# Patient Record
Sex: FEMALE | Race: WHITE | NOT HISPANIC OR LATINO | URBAN - METROPOLITAN AREA
[De-identification: names, ages, dates, MRNs, and addresses within clinical notes are randomized per-mention and may not be internally consistent; named-entity substitution may affect disease eponyms.]

---

## 2017-06-07 ENCOUNTER — APPOINTMENT (OUTPATIENT)
Dept: WOMENS IMAGING | Facility: HOSPITAL | Age: 52
End: 2017-06-07

## 2017-06-07 PROCEDURE — 77067 SCR MAMMO BI INCL CAD: CPT | Performed by: RADIOLOGY

## 2018-07-31 ENCOUNTER — APPOINTMENT (OUTPATIENT)
Dept: WOMENS IMAGING | Facility: HOSPITAL | Age: 53
End: 2018-07-31

## 2018-07-31 PROCEDURE — 77067 SCR MAMMO BI INCL CAD: CPT | Performed by: RADIOLOGY

## 2018-07-31 PROCEDURE — 77063 BREAST TOMOSYNTHESIS BI: CPT | Performed by: RADIOLOGY

## 2019-05-02 VITALS
RESPIRATION RATE: 14 BRPM | HEART RATE: 62 BPM | SYSTOLIC BLOOD PRESSURE: 122 MMHG | DIASTOLIC BLOOD PRESSURE: 77 MMHG | RESPIRATION RATE: 15 BRPM | DIASTOLIC BLOOD PRESSURE: 75 MMHG | DIASTOLIC BLOOD PRESSURE: 65 MMHG | HEART RATE: 76 BPM | RESPIRATION RATE: 20 BRPM | SYSTOLIC BLOOD PRESSURE: 123 MMHG | HEART RATE: 59 BPM | OXYGEN SATURATION: 98 % | HEIGHT: 65 IN | HEART RATE: 75 BPM | SYSTOLIC BLOOD PRESSURE: 140 MMHG | SYSTOLIC BLOOD PRESSURE: 130 MMHG | TEMPERATURE: 97.5 F | DIASTOLIC BLOOD PRESSURE: 80 MMHG | DIASTOLIC BLOOD PRESSURE: 84 MMHG | RESPIRATION RATE: 17 BRPM | HEART RATE: 71 BPM | OXYGEN SATURATION: 96 % | SYSTOLIC BLOOD PRESSURE: 126 MMHG | RESPIRATION RATE: 18 BRPM | SYSTOLIC BLOOD PRESSURE: 127 MMHG | SYSTOLIC BLOOD PRESSURE: 107 MMHG | HEART RATE: 68 BPM | WEIGHT: 137 LBS | DIASTOLIC BLOOD PRESSURE: 71 MMHG | OXYGEN SATURATION: 100 % | HEART RATE: 63 BPM | TEMPERATURE: 97.8 F | HEART RATE: 81 BPM | RESPIRATION RATE: 11 BRPM | OXYGEN SATURATION: 99 % | HEART RATE: 67 BPM | RESPIRATION RATE: 26 BRPM | DIASTOLIC BLOOD PRESSURE: 83 MMHG | DIASTOLIC BLOOD PRESSURE: 74 MMHG

## 2019-05-07 ENCOUNTER — AMBULATORY SURGICAL CENTER (AMBULATORY)
Dept: URBAN - METROPOLITAN AREA SURGERY 17 | Facility: SURGERY | Age: 54
End: 2019-05-07

## 2019-05-07 ENCOUNTER — OFFICE (AMBULATORY)
Dept: URBAN - METROPOLITAN AREA PATHOLOGY 4 | Facility: PATHOLOGY | Age: 54
End: 2019-05-07

## 2019-05-07 DIAGNOSIS — Z12.11 ENCOUNTER FOR SCREENING FOR MALIGNANT NEOPLASM OF COLON: ICD-10-CM

## 2019-05-07 DIAGNOSIS — D12.2 BENIGN NEOPLASM OF ASCENDING COLON: ICD-10-CM

## 2019-05-07 DIAGNOSIS — K64.8 OTHER HEMORRHOIDS: ICD-10-CM

## 2019-05-07 DIAGNOSIS — K57.30 DIVERTICULOSIS OF LARGE INTESTINE WITHOUT PERFORATION OR ABS: ICD-10-CM

## 2019-05-07 LAB
GI HISTOLOGY: A. UNSPECIFIED: (no result)
GI HISTOLOGY: PDF REPORT: (no result)

## 2019-05-07 PROCEDURE — 88305 TISSUE EXAM BY PATHOLOGIST: CPT | Mod: 33 | Performed by: INTERNAL MEDICINE

## 2019-05-07 PROCEDURE — 45380 COLONOSCOPY AND BIOPSY: CPT | Mod: 33 | Performed by: INTERNAL MEDICINE

## 2019-08-05 ENCOUNTER — APPOINTMENT (OUTPATIENT)
Dept: WOMENS IMAGING | Facility: HOSPITAL | Age: 54
End: 2019-08-05

## 2019-08-05 PROCEDURE — 77063 BREAST TOMOSYNTHESIS BI: CPT | Performed by: RADIOLOGY

## 2019-08-05 PROCEDURE — 77067 SCR MAMMO BI INCL CAD: CPT | Performed by: RADIOLOGY

## 2020-08-10 ENCOUNTER — APPOINTMENT (OUTPATIENT)
Dept: WOMENS IMAGING | Facility: HOSPITAL | Age: 55
End: 2020-08-10

## 2020-08-10 PROCEDURE — 77067 SCR MAMMO BI INCL CAD: CPT | Performed by: RADIOLOGY

## 2020-08-10 PROCEDURE — 77063 BREAST TOMOSYNTHESIS BI: CPT | Performed by: RADIOLOGY

## 2021-08-16 ENCOUNTER — APPOINTMENT (OUTPATIENT)
Dept: WOMENS IMAGING | Facility: HOSPITAL | Age: 56
End: 2021-08-16

## 2021-08-16 PROCEDURE — 77067 SCR MAMMO BI INCL CAD: CPT | Performed by: RADIOLOGY

## 2021-08-16 PROCEDURE — 77063 BREAST TOMOSYNTHESIS BI: CPT | Performed by: RADIOLOGY

## 2022-05-16 ENCOUNTER — OFFICE (AMBULATORY)
Dept: URBAN - METROPOLITAN AREA CLINIC 75 | Facility: CLINIC | Age: 57
End: 2022-05-16

## 2022-05-16 VITALS
HEART RATE: 84 BPM | OXYGEN SATURATION: 97 % | DIASTOLIC BLOOD PRESSURE: 82 MMHG | SYSTOLIC BLOOD PRESSURE: 124 MMHG | WEIGHT: 137 LBS | HEIGHT: 65 IN

## 2022-05-16 DIAGNOSIS — K57.92 DIVERTICULITIS OF INTESTINE, PART UNSPECIFIED, WITHOUT PERFO: ICD-10-CM

## 2022-05-16 DIAGNOSIS — K58.2 MIXED IRRITABLE BOWEL SYNDROME: ICD-10-CM

## 2022-05-16 DIAGNOSIS — Z86.010 PERSONAL HISTORY OF COLONIC POLYPS: ICD-10-CM

## 2022-05-16 DIAGNOSIS — K58.9 IRRITABLE BOWEL SYNDROME WITHOUT DIARRHEA: ICD-10-CM

## 2022-05-16 PROCEDURE — 99203 OFFICE O/P NEW LOW 30 MIN: CPT | Performed by: INTERNAL MEDICINE

## 2022-08-17 ENCOUNTER — APPOINTMENT (OUTPATIENT)
Dept: WOMENS IMAGING | Facility: HOSPITAL | Age: 57
End: 2022-08-17

## 2022-08-17 VITALS
HEART RATE: 74 BPM | OXYGEN SATURATION: 99 % | RESPIRATION RATE: 14 BRPM | RESPIRATION RATE: 15 BRPM | DIASTOLIC BLOOD PRESSURE: 80 MMHG | SYSTOLIC BLOOD PRESSURE: 129 MMHG | DIASTOLIC BLOOD PRESSURE: 70 MMHG | DIASTOLIC BLOOD PRESSURE: 74 MMHG | DIASTOLIC BLOOD PRESSURE: 73 MMHG | HEIGHT: 65 IN | SYSTOLIC BLOOD PRESSURE: 121 MMHG | SYSTOLIC BLOOD PRESSURE: 87 MMHG | DIASTOLIC BLOOD PRESSURE: 76 MMHG | RESPIRATION RATE: 17 BRPM | SYSTOLIC BLOOD PRESSURE: 123 MMHG | SYSTOLIC BLOOD PRESSURE: 125 MMHG | SYSTOLIC BLOOD PRESSURE: 112 MMHG | DIASTOLIC BLOOD PRESSURE: 72 MMHG | OXYGEN SATURATION: 98 % | HEART RATE: 67 BPM | DIASTOLIC BLOOD PRESSURE: 83 MMHG | RESPIRATION RATE: 23 BRPM | RESPIRATION RATE: 20 BRPM | DIASTOLIC BLOOD PRESSURE: 89 MMHG | SYSTOLIC BLOOD PRESSURE: 132 MMHG | OXYGEN SATURATION: 95 % | HEART RATE: 81 BPM | DIASTOLIC BLOOD PRESSURE: 78 MMHG | SYSTOLIC BLOOD PRESSURE: 108 MMHG | SYSTOLIC BLOOD PRESSURE: 114 MMHG | HEART RATE: 82 BPM | RESPIRATION RATE: 16 BRPM | DIASTOLIC BLOOD PRESSURE: 53 MMHG | HEART RATE: 62 BPM | SYSTOLIC BLOOD PRESSURE: 131 MMHG | HEART RATE: 71 BPM | HEART RATE: 75 BPM | TEMPERATURE: 97.2 F | HEART RATE: 64 BPM | SYSTOLIC BLOOD PRESSURE: 128 MMHG | RESPIRATION RATE: 22 BRPM | TEMPERATURE: 97.4 F | WEIGHT: 137 LBS | SYSTOLIC BLOOD PRESSURE: 127 MMHG | HEART RATE: 72 BPM

## 2022-08-17 PROCEDURE — 77067 SCR MAMMO BI INCL CAD: CPT | Performed by: RADIOLOGY

## 2022-08-17 PROCEDURE — 77063 BREAST TOMOSYNTHESIS BI: CPT | Performed by: RADIOLOGY

## 2022-08-23 ENCOUNTER — AMBULATORY SURGICAL CENTER (AMBULATORY)
Dept: URBAN - METROPOLITAN AREA SURGERY 17 | Facility: SURGERY | Age: 57
End: 2022-08-23

## 2022-08-23 ENCOUNTER — OFFICE (AMBULATORY)
Dept: URBAN - METROPOLITAN AREA PATHOLOGY 4 | Facility: PATHOLOGY | Age: 57
End: 2022-08-23

## 2022-08-23 DIAGNOSIS — K64.8 OTHER HEMORRHOIDS: ICD-10-CM

## 2022-08-23 DIAGNOSIS — K57.30 DIVERTICULOSIS OF LARGE INTESTINE WITHOUT PERFORATION OR ABS: ICD-10-CM

## 2022-08-23 DIAGNOSIS — K58.9 IRRITABLE BOWEL SYNDROME WITHOUT DIARRHEA: ICD-10-CM

## 2022-08-23 DIAGNOSIS — K62.1 RECTAL POLYP: ICD-10-CM

## 2022-08-23 DIAGNOSIS — Z86.010 PERSONAL HISTORY OF COLONIC POLYPS: ICD-10-CM

## 2022-08-23 LAB
GI HISTOLOGY: A. UNSPECIFIED: (no result)
GI HISTOLOGY: PDF REPORT: (no result)

## 2022-08-23 PROCEDURE — 88305 TISSUE EXAM BY PATHOLOGIST: CPT | Performed by: INTERNAL MEDICINE

## 2022-08-23 PROCEDURE — 45385 COLONOSCOPY W/LESION REMOVAL: CPT | Performed by: INTERNAL MEDICINE

## 2022-10-11 ENCOUNTER — APPOINTMENT (OUTPATIENT)
Dept: GENERAL RADIOLOGY | Facility: HOSPITAL | Age: 57
End: 2022-10-11

## 2022-10-11 ENCOUNTER — HOSPITAL ENCOUNTER (EMERGENCY)
Facility: HOSPITAL | Age: 57
Discharge: HOME | End: 2022-10-11
Attending: EMERGENCY MEDICINE | Admitting: EMERGENCY MEDICINE

## 2022-10-11 VITALS
WEIGHT: 143.74 LBS | SYSTOLIC BLOOD PRESSURE: 105 MMHG | HEART RATE: 86 BPM | RESPIRATION RATE: 20 BRPM | TEMPERATURE: 98 F | DIASTOLIC BLOOD PRESSURE: 76 MMHG | OXYGEN SATURATION: 95 %

## 2022-10-11 DIAGNOSIS — R79.89 ELEVATED SERUM CREATININE: ICD-10-CM

## 2022-10-11 DIAGNOSIS — I47.1 SVT (SUPRAVENTRICULAR TACHYCARDIA): Primary | ICD-10-CM

## 2022-10-11 DIAGNOSIS — R73.9 HYPERGLYCEMIA: ICD-10-CM

## 2022-10-11 LAB
ALBUMIN SERPL-MCNC: 4.7 G/DL (ref 3.5–5.2)
ALBUMIN/GLOB SERPL: 2.4 G/DL
ALP SERPL-CCNC: 94 U/L (ref 39–117)
ALT SERPL W P-5'-P-CCNC: 58 U/L (ref 1–33)
ANION GAP SERPL CALCULATED.3IONS-SCNC: 19.2 MMOL/L (ref 5–15)
AST SERPL-CCNC: 57 U/L (ref 1–32)
BASOPHILS # BLD MANUAL: 0.15 10*3/MM3 (ref 0–0.2)
BASOPHILS NFR BLD MANUAL: 1 % (ref 0–1.5)
BILIRUB SERPL-MCNC: 0.3 MG/DL (ref 0–1.2)
BUN SERPL-MCNC: 10 MG/DL (ref 6–20)
BUN/CREAT SERPL: 8.8 (ref 7–25)
CALCIUM SPEC-SCNC: 9.7 MG/DL (ref 8.6–10.5)
CHLORIDE SERPL-SCNC: 103 MMOL/L (ref 98–107)
CO2 SERPL-SCNC: 18.8 MMOL/L (ref 22–29)
CREAT SERPL-MCNC: 1.13 MG/DL (ref 0.57–1)
DEPRECATED RDW RBC AUTO: 39.3 FL (ref 37–54)
EGFRCR SERPLBLD CKD-EPI 2021: 56.2 ML/MIN/1.73
EOSINOPHIL # BLD MANUAL: 0.15 10*3/MM3 (ref 0–0.4)
EOSINOPHIL NFR BLD MANUAL: 1 % (ref 0.3–6.2)
ERYTHROCYTE [DISTWIDTH] IN BLOOD BY AUTOMATED COUNT: 11.4 % (ref 12.3–15.4)
GLOBULIN UR ELPH-MCNC: 2 GM/DL
GLUCOSE SERPL-MCNC: 193 MG/DL (ref 65–99)
HCT VFR BLD AUTO: 41.3 % (ref 34–46.6)
HGB BLD-MCNC: 14.3 G/DL (ref 12–15.9)
LYMPHOCYTES # BLD MANUAL: 5.94 10*3/MM3 (ref 0.7–3.1)
LYMPHOCYTES NFR BLD MANUAL: 3 % (ref 5–12)
MAGNESIUM SERPL-MCNC: 2.4 MG/DL (ref 1.6–2.6)
MCH RBC QN AUTO: 32.4 PG (ref 26.6–33)
MCHC RBC AUTO-ENTMCNC: 34.6 G/DL (ref 31.5–35.7)
MCV RBC AUTO: 93.7 FL (ref 79–97)
MONOCYTES # BLD: 0.46 10*3/MM3 (ref 0.1–0.9)
NEUTROPHILS # BLD AUTO: 8.76 10*3/MM3 (ref 1.7–7)
NEUTROPHILS NFR BLD MANUAL: 56.6 % (ref 42.7–76)
PLAT MORPH BLD: NORMAL
PLATELET # BLD AUTO: 293 10*3/MM3 (ref 140–450)
PMV BLD AUTO: 10.2 FL (ref 6–12)
POTASSIUM SERPL-SCNC: 3.6 MMOL/L (ref 3.5–5.2)
PROT SERPL-MCNC: 6.7 G/DL (ref 6–8.5)
RBC # BLD AUTO: 4.41 10*6/MM3 (ref 3.77–5.28)
RBC MORPH BLD: NORMAL
SODIUM SERPL-SCNC: 141 MMOL/L (ref 136–145)
T4 FREE SERPL-MCNC: 1.35 NG/DL (ref 0.93–1.7)
TROPONIN T SERPL-MCNC: <0.01 NG/ML (ref 0–0.03)
TSH SERPL DL<=0.05 MIU/L-ACNC: 5.25 UIU/ML (ref 0.27–4.2)
VARIANT LYMPHS NFR BLD MANUAL: 38.4 % (ref 19.6–45.3)
WBC MORPH BLD: NORMAL
WBC NRBC COR # BLD: 15.47 10*3/MM3 (ref 3.4–10.8)

## 2022-10-11 PROCEDURE — 99283 EMERGENCY DEPT VISIT LOW MDM: CPT

## 2022-10-11 PROCEDURE — 71045 X-RAY EXAM CHEST 1 VIEW: CPT

## 2022-10-11 PROCEDURE — 83735 ASSAY OF MAGNESIUM: CPT | Performed by: EMERGENCY MEDICINE

## 2022-10-11 PROCEDURE — 93010 ELECTROCARDIOGRAM REPORT: CPT | Performed by: INTERNAL MEDICINE

## 2022-10-11 PROCEDURE — 25010000002 ADENOSINE PER 6 MG

## 2022-10-11 PROCEDURE — 93005 ELECTROCARDIOGRAM TRACING: CPT | Performed by: EMERGENCY MEDICINE

## 2022-10-11 PROCEDURE — 84439 ASSAY OF FREE THYROXINE: CPT | Performed by: EMERGENCY MEDICINE

## 2022-10-11 PROCEDURE — 84484 ASSAY OF TROPONIN QUANT: CPT | Performed by: EMERGENCY MEDICINE

## 2022-10-11 PROCEDURE — 85007 BL SMEAR W/DIFF WBC COUNT: CPT | Performed by: EMERGENCY MEDICINE

## 2022-10-11 PROCEDURE — 80050 GENERAL HEALTH PANEL: CPT | Performed by: EMERGENCY MEDICINE

## 2022-10-11 PROCEDURE — 96374 THER/PROPH/DIAG INJ IV PUSH: CPT

## 2022-10-11 PROCEDURE — 96361 HYDRATE IV INFUSION ADD-ON: CPT

## 2022-10-11 PROCEDURE — 36415 COLL VENOUS BLD VENIPUNCTURE: CPT

## 2022-10-11 RX ORDER — ADENOSINE 3 MG/ML
6 INJECTION, SOLUTION INTRAVENOUS ONCE
Status: COMPLETED | OUTPATIENT
Start: 2022-10-11 | End: 2022-10-11

## 2022-10-11 RX ORDER — ADENOSINE 3 MG/ML
INJECTION, SOLUTION INTRAVENOUS
Status: COMPLETED
Start: 2022-10-11 | End: 2022-10-11

## 2022-10-11 RX ORDER — ADENOSINE 3 MG/ML
12 INJECTION, SOLUTION INTRAVENOUS
Status: DISCONTINUED | OUTPATIENT
Start: 2022-10-11 | End: 2022-10-11 | Stop reason: HOSPADM

## 2022-10-11 RX ADMIN — ADENOSINE 6 MG: 3 INJECTION INTRAVENOUS at 19:48

## 2022-10-11 RX ADMIN — ADENOSINE 6 MG: 3 INJECTION, SOLUTION INTRAVENOUS at 19:48

## 2022-10-11 RX ADMIN — SODIUM CHLORIDE 500 ML: 9 INJECTION, SOLUTION INTRAVENOUS at 19:45

## 2022-10-11 NOTE — ED TRIAGE NOTES
Pt to ER from home with c/o palpitations that started around 30 minutes ago. Pt has been in afib before, but does not take anything for it. Pt states she is nausea and lightheaded. Pt to triage with mask on along with nursing staff.

## 2022-10-11 NOTE — ED PROVIDER NOTES
EMERGENCY DEPARTMENT ENCOUNTER  I wore full protective equipment throughout this patient encounter including a N95 mask, eye shield, gown and gloves. Hand hygiene was performed before donning protective equipment and after removal when leaving the room.    Room Number:  05/05  Date of encounter:  10/11/2022  PCP: Provider, No Known    HPI:  Context: Anastacia Burgos is a 57 y.o. female who presents to the ED c/o chief complaint of heart racing.  Patient reports she had sudden onset heart racing sensation approximately an hour ago.  Patient reports that she feels her heart beating strongly and quickly, reports that she did have some lightheadedness, denies any syncope, no fall or trauma.  Patient does endorse associated shortness of breath, no chest pain.  Patient reports that she was at baseline health prior to onset of symptoms.  Patient reports history of SVT in the past, 1 prior episode, was seen in the emergency department, converted without medicines, not any medications for it.  Patient denies any recent vomiting or diarrhea, eating and drinking normally, no fever or systemic symptoms.    MEDICAL HISTORY REVIEW  Reviewed in EPIC    PAST MEDICAL HISTORY  Active Ambulatory Problems     Diagnosis Date Noted   • No Active Ambulatory Problems     Resolved Ambulatory Problems     Diagnosis Date Noted   • No Resolved Ambulatory Problems     No Additional Past Medical History       PAST SURGICAL HISTORY  No past surgical history on file.    FAMILY HISTORY  No family history on file.    SOCIAL HISTORY  Social History     Socioeconomic History   • Marital status: Unknown       ALLERGIES  Patient has no known allergies.    The patient's allergies have been reviewed    REVIEW OF SYSTEMS  All systems reviewed and negative except for those discussed in HPI.     PHYSICAL EXAM  I have reviewed the triage vital signs and nursing notes.  ED Triage Vitals [10/11/22 1931]   Temp Pulse Resp BP SpO2   98 °F (36.7 °C) -- 20 -- --       Temp src Heart Rate Source Patient Position BP Location FiO2 (%)   -- -- -- -- --       General: No acute distress.  HENT: NCAT, PERRL, Nares patent.  Eyes: no scleral icterus.  Neck: trachea midline, no ROM limitations.  CV: regular rhythm, extremely tachycardic rate rate.  Respiratory: normal effort, CTAB.  Abdomen: soft, nondistended, NTTP, no rebound tenderness, no guarding or rigidity.  Musculoskeletal: no deformity.  Neuro: alert, moves all extremities, follows commands.  Skin: warm, dry.    LAB RESULTS  Recent Results (from the past 24 hour(s))   ECG 12 Lead    Collection Time: 10/11/22  7:38 PM   Result Value Ref Range    QT Interval 287 ms   Comprehensive Metabolic Panel    Collection Time: 10/11/22  8:09 PM    Specimen: Blood   Result Value Ref Range    Glucose 193 (H) 65 - 99 mg/dL    BUN 10 6 - 20 mg/dL    Creatinine 1.13 (H) 0.57 - 1.00 mg/dL    Sodium 141 136 - 145 mmol/L    Potassium 3.6 3.5 - 5.2 mmol/L    Chloride 103 98 - 107 mmol/L    CO2 18.8 (L) 22.0 - 29.0 mmol/L    Calcium 9.7 8.6 - 10.5 mg/dL    Total Protein 6.7 6.0 - 8.5 g/dL    Albumin 4.70 3.50 - 5.20 g/dL    ALT (SGPT) 58 (H) 1 - 33 U/L    AST (SGOT) 57 (H) 1 - 32 U/L    Alkaline Phosphatase 94 39 - 117 U/L    Total Bilirubin 0.3 0.0 - 1.2 mg/dL    Globulin 2.0 gm/dL    A/G Ratio 2.4 g/dL    BUN/Creatinine Ratio 8.8 7.0 - 25.0    Anion Gap 19.2 (H) 5.0 - 15.0 mmol/L    eGFR 56.2 (L) >60.0 mL/min/1.73   Troponin    Collection Time: 10/11/22  8:09 PM    Specimen: Blood   Result Value Ref Range    Troponin T <0.010 0.000 - 0.030 ng/mL   T4, Free    Collection Time: 10/11/22  8:09 PM    Specimen: Blood   Result Value Ref Range    Free T4 1.35 0.93 - 1.70 ng/dL   TSH    Collection Time: 10/11/22  8:09 PM    Specimen: Blood   Result Value Ref Range    TSH 5.250 (H) 0.270 - 4.200 uIU/mL   Magnesium    Collection Time: 10/11/22  8:09 PM    Specimen: Blood   Result Value Ref Range    Magnesium 2.4 1.6 - 2.6 mg/dL   CBC Auto Differential     Collection Time: 10/11/22  8:09 PM    Specimen: Blood   Result Value Ref Range    WBC 15.47 (H) 3.40 - 10.80 10*3/mm3    RBC 4.41 3.77 - 5.28 10*6/mm3    Hemoglobin 14.3 12.0 - 15.9 g/dL    Hematocrit 41.3 34.0 - 46.6 %    MCV 93.7 79.0 - 97.0 fL    MCH 32.4 26.6 - 33.0 pg    MCHC 34.6 31.5 - 35.7 g/dL    RDW 11.4 (L) 12.3 - 15.4 %    RDW-SD 39.3 37.0 - 54.0 fl    MPV 10.2 6.0 - 12.0 fL    Platelets 293 140 - 450 10*3/mm3   Manual Differential    Collection Time: 10/11/22  8:09 PM    Specimen: Blood   Result Value Ref Range    Neutrophil % 56.6 42.7 - 76.0 %    Lymphocyte % 38.4 19.6 - 45.3 %    Monocyte % 3.0 (L) 5.0 - 12.0 %    Eosinophil % 1.0 0.3 - 6.2 %    Basophil % 1.0 0.0 - 1.5 %    Neutrophils Absolute 8.76 (H) 1.70 - 7.00 10*3/mm3    Lymphocytes Absolute 5.94 (H) 0.70 - 3.10 10*3/mm3    Monocytes Absolute 0.46 0.10 - 0.90 10*3/mm3    Eosinophils Absolute 0.15 0.00 - 0.40 10*3/mm3    Basophils Absolute 0.15 0.00 - 0.20 10*3/mm3    RBC Morphology Normal Normal    WBC Morphology Normal Normal    Platelet Morphology Normal Normal       I ordered the above labs and reviewed the results.    RADIOLOGY  XR Chest 1 View    Result Date: 10/11/2022  XR CHEST 1 VW-  Clinical: Palpitation  COMPARISON 4/22/2014  FINDINGS: The cardiomediastinal silhouette is stable and satisfactory in appearance. The lungs are clear. No effusion or edema seen.  CONCLUSION: No active disease of the chest  This report was finalized on 10/11/2022 8:23 PM by Dr. Chao Jean M.D.        I ordered the above noted radiological studies. I reviewed the images and results. I agree with the radiologist interpretation.    PROCEDURES  Procedures    MEDICATIONS GIVEN IN ER  Medications   adenosine (ADENOCARD) injection 12 mg (has no administration in time range)   adenosine (ADENOCARD) injection 6 mg (6 mg Intravenous Given 10/11/22 1948)   sodium chloride 0.9 % bolus 500 mL (0 mL Intravenous Stopped 10/11/22 1954)       PROGRESS, DATA ANALYSIS,  CONSULTS, AND MEDICAL DECISION MAKING  A complete history and physical exam have been performed.  All available laboratory and imaging results have been reviewed by myself prior to disposition.    MDM  After the initial H&P, I discussed pertinent information from history and physical exam with patient/family.  Discussed differential diagnosis.  Discussed plan for ED evaluation/workup/treatment.  All questions answered.  Patient/family is agreeable with plan.  ED Course as of 10/11/22 2120   Tue Oct 11, 2022   1947 Patient presented in SVT, heart rate approximately 200, borderline low blood pressure, systolic of 100.  Initially attempted chemical cardioversion with 6 mg of adenosine, unsuccessful.  Blood pressure decreased to 90/60.  Preparing for second attempted chemical cardioversion, giving IV fluids, while preparing, patient had spontaneous conversion to normal sinus rhythm.  Obtaining repeat EKG. [JG]   1949 EKG independently viewed and contemporaneously interpreted by ED physician. Time: 1938.  Rate 200.  Interpretation: SVT, normal axis, RVH, diffuse ST changes likely rate related. [JG]   1950 EKG independently viewed and contemporaneously interpreted by ED physician. Time: 1948.  Rate 72.  Interpretation: Normal sinus rhythm with occasional conducted PAC, normal axis, right bundle branch block, previously seen ST changes are more minimal and appear to be resolving. [JG]   2023 Patient reassessed.  Patient is currently asymptomatic, reports complete symptom resolution.  Patient is not tachycardic at present, normotensive.  Discussed pending ED work-up.  Patient has no questions or concerns at present. [JG]   2118 Patient has leukocytosis, likely reactive as patient denied any fever or infectious symptoms.  Patient has elevated creatinine at the upper limit of normal with mild anion gap, likely secondary to severe tachycardia, should resolve without treatment.  Patient did receive IV fluids here.  Glucose  elevated, patient will need outpatient follow-up for further evaluation as this could be for sign of diabetes.  Patient remained asymptomatic since heart rhythm converted.  Patient is well-appearing, requesting discharge.  Discussed need for close follow-up with primary care, discussed referral to cardiology, extensive discussion return precautions.  Discharging. [JG]      ED Course User Index  [JG] Fabien Chinchilla MD       AS OF 21:20 EDT VITALS:    BP - 105/76  HR - 86  TEMP - 98 °F (36.7 °C)  O2 SATS - 95%    DIAGNOSIS  Final diagnoses:   SVT (supraventricular tachycardia) (HCC)   Hyperglycemia   Elevated serum creatinine         DISPOSITION  DISCHARGE    Patient discharged in stable condition.    Reviewed implications of results, diagnosis, meds, responsibility to follow up, warning signs and symptoms of possible worsening, potential complications and reasons to return to ER.    Patient/Family voiced understanding of above instructions.    Discussed plan for discharge, as there is no emergent indication for admission. Patient referred to primary care provider for BP management due to today's BP. Pt/family is agreeable and understands need for follow up and repeat testing.  Pt is aware that discharge does not mean that nothing is wrong but it indicates no emergency is present that requires admission and they must continue care with follow-up as given below or physician of their choice.     FOLLOW-UP  Your PCP    Schedule an appointment as soon as possible for a visit in 2 days  even if well    PATIENT CONNECTION - [facility] [id number] [phone]    if you are unable to follow up with your PCP    [facility] CARDIOLOGY  [address]  [address]  [facility] [address] [phone]  Schedule an appointment as soon as possible for a visit in 2 days  for further evaluation/management of your SVT         Medication List      No changes were made to your prescriptions  during this visit.          Fabien Chinchilla MD  10/11/22 0121

## 2022-10-11 NOTE — NURSING NOTE
Pt presents to Er 5. Cardiac monitor appeared to show svt in the 204 range.  B/p noted as 62/51. 500 ns and 6mg IV adenosine given x1 per dr leslie, preparation made for 2nd dose and possible defib. As pt was being repositioned for defib paddles hr noted to convert to nsr 82.

## 2022-10-12 LAB
QT INTERVAL: 287 MS
QT INTERVAL: 398 MS

## 2022-10-17 ENCOUNTER — OFFICE VISIT (OUTPATIENT)
Dept: CARDIOLOGY | Facility: CLINIC | Age: 57
End: 2022-10-17

## 2022-10-17 VITALS
HEIGHT: 65 IN | WEIGHT: 140 LBS | OXYGEN SATURATION: 99 % | RESPIRATION RATE: 18 BRPM | DIASTOLIC BLOOD PRESSURE: 84 MMHG | BODY MASS INDEX: 23.32 KG/M2 | SYSTOLIC BLOOD PRESSURE: 118 MMHG | HEART RATE: 59 BPM

## 2022-10-17 DIAGNOSIS — I47.1 PAROXYSMAL SVT (SUPRAVENTRICULAR TACHYCARDIA): Primary | ICD-10-CM

## 2022-10-17 DIAGNOSIS — E78.2 MIXED HYPERLIPIDEMIA: ICD-10-CM

## 2022-10-17 PROCEDURE — 93000 ELECTROCARDIOGRAM COMPLETE: CPT | Performed by: INTERNAL MEDICINE

## 2022-10-17 PROCEDURE — 99204 OFFICE O/P NEW MOD 45 MIN: CPT | Performed by: INTERNAL MEDICINE

## 2022-10-17 RX ORDER — AMOXICILLIN 875 MG/1
TABLET, COATED ORAL
COMMUNITY
Start: 2022-10-12 | End: 2022-10-27

## 2022-10-17 RX ORDER — ROSUVASTATIN CALCIUM 10 MG/1
TABLET, COATED ORAL
COMMUNITY
Start: 2022-10-09

## 2022-10-17 NOTE — PROGRESS NOTES
"      CARDIOLOGY    Quiana Valera MD    ENCOUNTER DATE:  10/17/2022    Anastacia Burgos / 57 y.o. / female        CHIEF COMPLAINT / REASON FOR OFFICE VISIT     Heart Problem (ER follow up SVT, Elevated creatine serum/)      HISTORY OF PRESENT ILLNESS       HPI    Anastacia Burgos is a 57 y.o. female     This lady reports a history of CC about 8 years ago.  On 10/11/2022, she was having dinner and suddenly felt her heart racing.  She tried Valsalva, cold water to the face, but her heart continued to race.  She went to the emergency room.  EKG there showed SVT.  She was given adenosine and converted to a sinus rhythm.  She had a stress echo at [facility] in 2021.  She, otherwise, feels fine.  She is not having shortness of breath, edema, lightheadedness, chest pain, or fatigue.      REVIEW OF SYSTEMS     Review of Systems   Constitutional: Negative for chills, fever, weight gain and weight loss.   Cardiovascular: Negative for leg swelling.   Respiratory: Negative for cough, snoring and wheezing.    Hematologic/Lymphatic: Negative for bleeding problem. Does not bruise/bleed easily.   Skin: Negative for color change.   Musculoskeletal: Negative for falls, joint pain and myalgias.   Gastrointestinal: Negative for melena.   Genitourinary: Negative for hematuria.   Neurological: Negative for excessive daytime sleepiness.   Psychiatric/Behavioral: Negative for depression. The patient is not nervous/anxious.          VITAL SIGNS     Visit Vitals  /84 (BP Location: Right arm, Patient Position: Sitting, Cuff Size: Adult)   Pulse 59   Resp 18   Ht 163.8 cm (64.5\")   Wt 63.5 kg (140 lb)   SpO2 99%   BMI 23.66 kg/m²         Wt Readings from Last 3 Encounters:   10/17/22 63.5 kg (140 lb)   10/11/22 65.2 kg (143 lb 11.8 oz)     Body mass index is 23.66 kg/m².      PHYSICAL EXAMINATION     Constitutional:       General: Not in acute distress.  Neck:      Vascular: No carotid bruit or JVD.   Pulmonary:      Effort: Pulmonary effort is " normal.      Breath sounds: Normal breath sounds.   Cardiovascular:      Normal rate. Regular rhythm.      Murmurs: There is no murmur.   Psychiatric:         Mood and Affect: Mood and affect normal.           REVIEWED DATA       ECG 12 Lead    Date/Time: 10/17/2022 10:35 AM  Performed by: Quiana Valera MD  Authorized by: Quiana Valera MD   Comparison: compared with previous ECG from 10/11/2022  Comparison to previous ECG: SVT has resolved  Rhythm: sinus rhythm  BPM: 59  Conduction: conduction normal  Conduction: incomplete right bundle branch block  ST Segments: ST segments normal  T Waves: T waves normal  Other findings: non-specific ST-T wave changes    Clinical impression: abnormal EKG                  Lab Results   Component Value Date    GLUCOSE 193 (H) 10/11/2022    BUN 10 10/11/2022    CREATININE 1.13 (H) 10/11/2022    BCR 8.8 10/11/2022    K 3.6 10/11/2022    CO2 18.8 (L) 10/11/2022    CALCIUM 9.7 10/11/2022    ALBUMIN 4.70 10/11/2022    AST 57 (H) 10/11/2022    ALT 58 (H) 10/11/2022       ASSESSMENT & PLAN      Diagnosis Plan   1. Paroxysmal SVT (supraventricular tachycardia) (HCC)        2. Mixed hyperlipidemia            1. SVT. She has had two bouts of this in 8 years. No exacerbating or relieving factors. She had a stress echo at [facility] in 2021 and I am going to try to get results of that. We discussed her options including waiting, ablation or seeing an EP to discuss further. She is leaning towards seeing an EP to discuss further. I will review the results of her stress echo from [facility] in 2021 when they are available from [facility] and forward her EKG's onto Dr. Toribio and Dr. Conway for further review.   2. Hyperlipidemia on rosuvastatin. No cholesterol numbers to review at this time, but I did review her other laboratory data from her trip to the ER.   3. Mildly elevated creatinine. Follow up with PCP.     Addendum: I received a copy of the stress echo from the [facility] system September 28,  2021.  She exercised for 9 minutes and 19 seconds.  Resting ejection fraction of 50 to 55%.  No evidence of ischemia.  Normal heart valves.    Orders Placed This Encounter   Procedures   • ECG 12 Lead     This order was created via procedure documentation     Order Specific Question:   Release to patient     Answer:   Routine Release           MEDICATIONS         Discharge Medications          Accurate as of October 17, 2022 10:39 AM. If you have any questions, ask your nurse or doctor.            Continue These Medications      Instructions Start Date   amoxicillin 875 MG tablet  Commonly known as: AMOXIL   No dose, route, or frequency recorded.      rosuvastatin 10 MG tablet  Commonly known as: CRESTOR   No dose, route, or frequency recorded.               Quiana Valera MD  10/17/22  10:39 EDT    Part of this note may be an electronic transcription/translation of spoken language to printed text using the Dragon dictation system.

## 2022-10-27 ENCOUNTER — OFFICE VISIT (OUTPATIENT)
Dept: CARDIOLOGY | Facility: CLINIC | Age: 57
End: 2022-10-27

## 2022-10-27 VITALS
SYSTOLIC BLOOD PRESSURE: 138 MMHG | BODY MASS INDEX: 23.49 KG/M2 | WEIGHT: 141 LBS | DIASTOLIC BLOOD PRESSURE: 86 MMHG | HEART RATE: 73 BPM | HEIGHT: 65 IN

## 2022-10-27 DIAGNOSIS — I47.1 PAROXYSMAL SVT (SUPRAVENTRICULAR TACHYCARDIA): Primary | ICD-10-CM

## 2022-10-27 PROCEDURE — 93000 ELECTROCARDIOGRAM COMPLETE: CPT

## 2022-10-27 PROCEDURE — 99213 OFFICE O/P EST LOW 20 MIN: CPT

## 2022-10-27 NOTE — PROGRESS NOTES
Date of Office Visit: 10/27/2022  Encounter Provider: JEROME Cesar  Place of Service: [facility] CARDIOLOGY  Patient Name: Anastacia Burgos  :1965    Chief Complaint   Patient presents with   • Rapid Heart Rate     New Patient Consult / Dr. Valera ref   :     HPI: Anastacia Burgos is a 57 y.o. female who follows with Dr. Valera, referred to Dr. Mcallister for SVT.      She presented to the ED on 10/11/2022 with complaints of palpitations. She was in SVT with a rate of 200 bpm, she received adenosine and converted to NSR.     Reviewing her chart. She had a similar episode in , presented to ED in SVT and broke with valsalva.     She had normal stress echo last year at [facility].                She presents today to establish care.     She says that on 10/11 she was eating dinner and suddenly felt her heart racing and became very dizzy. She tried valsalva and cold water but was not effective. After ~30 minutes she came to ED and was in SVT, broke with one dose of adenosine.     Patient says that this was the second episode of SVT that she has had. The first occurred in , she presented to the ED in SVT but broke with valsalva.     She denies any history of palpitations, dizziness, or syncope prior to .     Patient does not recall any similar episodes as a child.     Denies having shortness of breath, edema, lightheadedness, chest pain, or fatigue.    EKG today shows NSR.     She is not on any AV charly blockers.     History reviewed. No pertinent past medical history.    History reviewed. No pertinent surgical history.    Social History     Socioeconomic History   • Marital status: Unknown   Tobacco Use   • Smoking status: Some Days     Types: Cigarettes, Cigars   • Smokeless tobacco: Never   • Tobacco comments:     Occasional Cigar usage           Caffeine: Yes, Coffee in AM -- ice tea throughout the day    Vaping Use   • Vaping Use: Never used   Substance and Sexual Activity   • Alcohol  "use: Yes     Alcohol/week: 3.0 standard drinks     Types: 3 Glasses of wine per week   • Drug use: Never   • Sexual activity: Defer       Family History   Problem Relation Age of Onset   • Stroke Neg Hx    • Heart attack Neg Hx    • Heart disease Neg Hx    • Aneurysm Neg Hx    • Bone cancer Neg Hx    • Cancer Neg Hx        Review of Systems   Constitutional: Negative for chills, fever and malaise/fatigue.   Cardiovascular: Negative for chest pain, dyspnea on exertion, leg swelling, near-syncope, orthopnea, palpitations, paroxysmal nocturnal dyspnea and syncope.   Respiratory: Negative for cough and shortness of breath.    Musculoskeletal: Negative for joint pain, joint swelling and myalgias.   Gastrointestinal: Negative for abdominal pain, diarrhea, melena, nausea and vomiting.   Genitourinary: Negative for frequency and hematuria.   Neurological: Negative for light-headedness, numbness, paresthesias and seizures.   Allergic/Immunologic: Negative.    All other systems reviewed and are negative.      No Known Allergies      Current Outpatient Medications:   •  rosuvastatin (CRESTOR) 10 MG tablet, , Disp: , Rfl:   •  amoxicillin (AMOXIL) 875 MG tablet, , Disp: , Rfl:       Objective:     Vitals:    10/27/22 1132   BP: 138/86   Pulse: 73   Weight: 64 kg (141 lb)   Height: 165.1 cm (65\")     Body mass index is 23.46 kg/m².    PHYSICAL EXAM:    Vitals Reviewed.   General Appearance: No acute distress, well developed and well nourished.   Eyes: Conjunctiva and lids: No erythema, swelling, or discharge. Sclera non-icteric.   HENT: Atraumatic, normocephalic. External eyes, ears, and nose normal.   Respiratory: No signs of respiratory distress. Respiration rhythm and depth normal.   Clear to auscultation. No rales, crackles, rhonchi, or wheezing auscultated.   Cardiovascular:  Heart Rate and Rhythm: Normal, Heart Sounds: Normal S1 and S2. No S3 or S4 noted.  Gastrointestinal:  Abdomen soft, non-distended, non-tender. "   Musculoskeletal: Normal movement of extremities  Skin: Warm and dry.   Psychiatric: Patient alert and oriented to person, place, and time. Speech and behavior appropriate. Normal mood and affect.       ECG 12 Lead    Date/Time: 10/27/2022 11:02 PM  Performed by: Paul Miles APRN  Authorized by: Paul Miles APRN   Comparison: compared with previous ECG   Similar to previous ECG  Rhythm: sinus rhythm  BPM: 75                Assessment:       Diagnosis Plan   1. Paroxysmal SVT (supraventricular tachycardia) (HCC)               Plan:   1. Paroxsymal SVT-- she has had two lifetime episodes of SVT both of which she presented to the ED, the second episode requiring adenosine to establish NSR.      We had a long discussion about SVT. I reviewed the case with Dr. Conway and offered the patient ablation, she is hesitant due to only having two episodes. We also discussed medication, she strongly wishes to not take additional medication.       After discussion she would like to go home and think about ablation. She is going to let us know if she wishes to proceed.           As always, it has been a pleasure to participate in your patient's care.      Sincerely,         JEROME Cesar

## 2023-01-12 ENCOUNTER — OFFICE (AMBULATORY)
Dept: URBAN - METROPOLITAN AREA CLINIC 76 | Facility: CLINIC | Age: 58
End: 2023-01-12

## 2023-01-12 VITALS
SYSTOLIC BLOOD PRESSURE: 153 MMHG | WEIGHT: 141 LBS | DIASTOLIC BLOOD PRESSURE: 83 MMHG | HEIGHT: 65 IN | DIASTOLIC BLOOD PRESSURE: 80 MMHG | SYSTOLIC BLOOD PRESSURE: 139 MMHG | OXYGEN SATURATION: 97 %

## 2023-01-12 DIAGNOSIS — Z86.010 PERSONAL HISTORY OF COLONIC POLYPS: ICD-10-CM

## 2023-01-12 DIAGNOSIS — R10.32 LEFT LOWER QUADRANT PAIN: ICD-10-CM

## 2023-01-12 DIAGNOSIS — K57.92 DIVERTICULITIS OF INTESTINE, PART UNSPECIFIED, WITHOUT PERFO: ICD-10-CM

## 2023-01-12 PROCEDURE — 99213 OFFICE O/P EST LOW 20 MIN: CPT | Performed by: INTERNAL MEDICINE
